# Patient Record
Sex: FEMALE | Race: WHITE | NOT HISPANIC OR LATINO | Employment: UNEMPLOYED | URBAN - METROPOLITAN AREA
[De-identification: names, ages, dates, MRNs, and addresses within clinical notes are randomized per-mention and may not be internally consistent; named-entity substitution may affect disease eponyms.]

---

## 2024-06-17 ENCOUNTER — OFFICE VISIT (OUTPATIENT)
Dept: FAMILY MEDICINE CLINIC | Facility: CLINIC | Age: 24
End: 2024-06-17
Payer: COMMERCIAL

## 2024-06-17 VITALS
HEIGHT: 66 IN | WEIGHT: 152 LBS | HEART RATE: 90 BPM | DIASTOLIC BLOOD PRESSURE: 82 MMHG | OXYGEN SATURATION: 96 % | RESPIRATION RATE: 18 BRPM | BODY MASS INDEX: 24.43 KG/M2 | SYSTOLIC BLOOD PRESSURE: 106 MMHG

## 2024-06-17 DIAGNOSIS — Z11.59 NEED FOR HEPATITIS C SCREENING TEST: ICD-10-CM

## 2024-06-17 DIAGNOSIS — Z23 NEED FOR VACCINATION: ICD-10-CM

## 2024-06-17 DIAGNOSIS — E34.9 HORMONE IMBALANCE: ICD-10-CM

## 2024-06-17 DIAGNOSIS — F64.9 GENDER DYSPHORIA: Primary | ICD-10-CM

## 2024-06-17 PROCEDURE — 90471 IMMUNIZATION ADMIN: CPT | Performed by: NURSE PRACTITIONER

## 2024-06-17 PROCEDURE — 90715 TDAP VACCINE 7 YRS/> IM: CPT | Performed by: NURSE PRACTITIONER

## 2024-06-17 PROCEDURE — 99213 OFFICE O/P EST LOW 20 MIN: CPT | Performed by: NURSE PRACTITIONER

## 2024-06-17 NOTE — PROGRESS NOTES
Subjective   Mary Ellen Morillo is a 23 y.o. adult.     History of Present Illness     Patient presents with gender dysphoria, ongoing, chronic. He reports that they have had feelings of gender dysphoria since  age 18.  He started HRT in [place] approximately 18 months ago and has been doing well. Patient has used the name Jonas since.2021.  Patient has spoken to a therapist and discussed transitioning.  We discussed today HRT and it's affects on the body including infertility. Patient reports that they do not wish to have biological children of their own and acknowledge that the affects may be irreversible. We discussed gender affirming surgeries and patient would like to eventually have top surgery which is scheduled in [place] for the end of July.     The following portions of the patient's history were reviewed and updated as appropriate: allergies, current medications, past family history, past medical history, past social history, past surgical history and problem list.    Review of Systems   Constitutional:  Negative for chills, fatigue and fever.   Respiratory:  Negative for cough, chest tightness, shortness of breath and wheezing.    Cardiovascular:  Negative for chest pain, palpitations and leg swelling.   Neurological:  Negative for dizziness and headache.   Hematological: Negative.    Psychiatric/Behavioral: Negative.  Negative for sleep disturbance.        Objective   Physical Exam  Vitals and nursing note reviewed.   Constitutional:       Appearance: He is well-developed.   HENT:      Head: Normocephalic and atraumatic.   Eyes:      Conjunctiva/sclera: Conjunctivae normal.      Pupils: Pupils are equal, round, and reactive to light.   Cardiovascular:      Rate and Rhythm: Normal rate and regular rhythm.      Heart sounds: Normal heart sounds. No murmur heard.  Pulmonary:      Effort: Pulmonary effort is normal.      Breath sounds: Normal breath sounds.   Neurological:      Mental Status: He is alert and  oriented to person, place, and time.   Psychiatric:         Behavior: Behavior normal.         Thought Content: Thought content normal.         Judgment: Judgment normal.         Vitals:    06/17/24 1321   BP: 106/82   Pulse: 90   Resp: 18   SpO2: 96%     Body mass index is 24.53 kg/m².      Procedures    Assessment & Plan   Problems Addressed this Visit    None  Visit Diagnoses       Gender dysphoria    -  Primary    Relevant Orders    Testosterone    CBC (No Diff)    Lipid Panel With LDL / HDL Ratio    Comprehensive Metabolic Panel    Need for hepatitis C screening test        Relevant Orders    Hepatitis C antibody    Need for vaccination        Relevant Orders    Tdap Vaccine => 8yo IM (BOOSTRIX)    Hormone imbalance        Relevant Orders    Testosterone    CBC (No Diff)    Lipid Panel With LDL / HDL Ratio    Comprehensive Metabolic Panel          Diagnoses         Codes Comments    Gender dysphoria    -  Primary ICD-10-CM: F64.9  ICD-9-CM: 302.6     Need for hepatitis C screening test     ICD-10-CM: Z11.59  ICD-9-CM: V73.89     Need for vaccination     ICD-10-CM: Z23  ICD-9-CM: V05.9     Hormone imbalance     ICD-10-CM: E34.9  ICD-9-CM: 259.9           Testosterone  CBC  Lipid panel  CMP         Established gender dysphoria, hormonal imbalance in a transgender patient with a strong desire for gender affirming hormone therapy.     Gender identity is consistent, persistent, and insistent. Discussed risks, benefits, alternatives, potential side effects of therapy, and typical follow up. Resources provided including [facility] Standards of Care, PFLAG “Our Trans Loved Ones”, and local support groups.      F/u with a phone call/message in 2-3 weeks, and an office visit in 2 months. Will order labs at f/u. Encouraged to sign up for and use [employer].    >50% of this 30 min visit spent in counseling about gender-affirming hormone therapy.     Return in about 3 months (around 9/17/2024) for Labs, Recheck.

## 2024-06-17 NOTE — PATIENT INSTRUCTIONS
Transmale Resources    [facility] - https://[employer]/    [facility] Transgender Support Group - Facebook group    [employer] for Transgender Health, Standards of Care  https://www.wpath.org/publications/soc - pages 37 and 40 especially     [facility], Transgender Health - http://transhealth.[facility]/guidelines      [facility] - [email or web address]    [facility] - [email or web address]/care/medical/[email or web address]-health/     Parents, Families, and Friends of Lesbians and [place] - https://www.pflag.org/ourtranslovedones     [facility] [place] - https://transequality.org/documents    * * * * * * *      www.goodrx.com     1 ml, Luer-Ross Tip  Syringes - ex: https://www.[employer]/dp/I24UXZYB2D     18 G x 1.5” hypodermic needles for drawing up - ex: https://www.[employer]/dp/M01F6QY04W     25 G x 5/8” hypodermic needles for injecting - ex: https://www.[employer]/dp/T24UYAZSQ5/     [facility] Trans Health Injection Guide -   https://[facility].org/wp-content/uploads/MG-6_TransHealth_InjectionGuide.pdf     Injection supplies:  Syringes   Needles   Alcohol swabs  Cotton balls  Sharps container

## 2024-06-18 LAB
ALBUMIN SERPL-MCNC: 4.9 G/DL (ref 4–5)
ALP SERPL-CCNC: 73 IU/L (ref 44–121)
ALT SERPL-CCNC: 15 IU/L (ref 0–32)
AST SERPL-CCNC: 19 IU/L (ref 0–40)
BILIRUB SERPL-MCNC: 0.3 MG/DL (ref 0–1.2)
BUN SERPL-MCNC: 15 MG/DL (ref 6–20)
BUN/CREAT SERPL: 16 (ref 9–23)
CALCIUM SERPL-MCNC: 9.9 MG/DL (ref 8.7–10.2)
CHLORIDE SERPL-SCNC: 104 MMOL/L (ref 96–106)
CHOLEST SERPL-MCNC: 202 MG/DL (ref 100–199)
CO2 SERPL-SCNC: 22 MMOL/L (ref 20–29)
CREAT SERPL-MCNC: 0.95 MG/DL (ref 0.57–1)
EGFRCR SERPLBLD CKD-EPI 2021: 87 ML/MIN/1.73
ERYTHROCYTE [DISTWIDTH] IN BLOOD BY AUTOMATED COUNT: 12.7 % (ref 11.7–15.4)
GLOBULIN SER CALC-MCNC: 2.8 G/DL (ref 1.5–4.5)
GLUCOSE SERPL-MCNC: 79 MG/DL (ref 70–99)
HCT VFR BLD AUTO: 50.1 % (ref 34–46.6)
HCV IGG SERPL QL IA: NON REACTIVE
HDLC SERPL-MCNC: 41 MG/DL
HGB BLD-MCNC: 16.6 G/DL (ref 11.1–15.9)
LDLC SERPL CALC-MCNC: 152 MG/DL (ref 0–99)
LDLC/HDLC SERPL: 3.7 RATIO (ref 0–3.2)
MCH RBC QN AUTO: 29.4 PG (ref 26.6–33)
MCHC RBC AUTO-ENTMCNC: 33.1 G/DL (ref 31.5–35.7)
MCV RBC AUTO: 89 FL (ref 79–97)
PLATELET # BLD AUTO: 281 X10E3/UL (ref 150–450)
POTASSIUM SERPL-SCNC: 4.6 MMOL/L (ref 3.5–5.2)
PROT SERPL-MCNC: 7.7 G/DL (ref 6–8.5)
RBC # BLD AUTO: 5.64 X10E6/UL (ref 3.77–5.28)
SODIUM SERPL-SCNC: 143 MMOL/L (ref 134–144)
TESTOST SERPL-MCNC: 473 NG/DL (ref 13–71)
TRIGL SERPL-MCNC: 52 MG/DL (ref 0–149)
VLDLC SERPL CALC-MCNC: 9 MG/DL (ref 5–40)
WBC # BLD AUTO: 7.2 X10E3/UL (ref 3.4–10.8)

## 2024-08-14 DIAGNOSIS — F64.9 GENDER DYSPHORIA: Primary | ICD-10-CM

## 2024-08-14 NOTE — TELEPHONE ENCOUNTER
Last office visit: 6/17/24    Next office visit: 8/27/24     Last refill: 5/2/24    Last lab: 6/17/24

## 2024-08-15 DIAGNOSIS — E34.9 HORMONE IMBALANCE: ICD-10-CM

## 2024-08-15 DIAGNOSIS — F64.9 GENDER DYSPHORIA: Primary | ICD-10-CM

## 2024-08-15 RX ORDER — NEEDLES, DISPOSABLE 18GX1"
1 NEEDLE, DISPOSABLE MISCELLANEOUS WEEKLY
Qty: 100 EACH | Refills: 1 | Status: SHIPPED | OUTPATIENT
Start: 2024-08-15

## 2024-08-15 RX ORDER — NEEDLES, SAFETY 22GX1 1/2"
1 NEEDLE, DISPOSABLE MISCELLANEOUS WEEKLY
Qty: 100 EACH | Refills: 1 | Status: SHIPPED | OUTPATIENT
Start: 2024-08-15

## 2024-08-15 RX ORDER — SYRINGE, DISPOSABLE, 1 ML
1 SYRINGE, EMPTY DISPOSABLE MISCELLANEOUS WEEKLY
Qty: 100 EACH | Refills: 1 | Status: SHIPPED | OUTPATIENT
Start: 2024-08-15

## 2024-08-15 RX ORDER — TESTOSTERONE CYPIONATE 200 MG/ML
200 INJECTION, SOLUTION INTRAMUSCULAR WEEKLY
Qty: 1 ML | Refills: 3 | OUTPATIENT
Start: 2024-08-15

## 2024-08-15 RX ORDER — TESTOSTERONE CYPIONATE 200 MG/ML
INJECTION, SOLUTION INTRAMUSCULAR
Qty: 4 ML | Refills: 0 | Status: SHIPPED | OUTPATIENT
Start: 2024-08-15

## 2024-08-27 ENCOUNTER — OFFICE VISIT (OUTPATIENT)
Dept: FAMILY MEDICINE CLINIC | Facility: CLINIC | Age: 24
End: 2024-08-27
Payer: COMMERCIAL

## 2024-08-27 VITALS
HEART RATE: 82 BPM | RESPIRATION RATE: 18 BRPM | HEIGHT: 66 IN | DIASTOLIC BLOOD PRESSURE: 72 MMHG | OXYGEN SATURATION: 97 % | BODY MASS INDEX: 24.27 KG/M2 | SYSTOLIC BLOOD PRESSURE: 118 MMHG | WEIGHT: 151 LBS

## 2024-08-27 DIAGNOSIS — E34.9 HORMONE IMBALANCE: ICD-10-CM

## 2024-08-27 DIAGNOSIS — F64.9 GENDER DYSPHORIA: Primary | ICD-10-CM

## 2024-08-27 PROCEDURE — 99213 OFFICE O/P EST LOW 20 MIN: CPT | Performed by: NURSE PRACTITIONER

## 2024-08-27 NOTE — PROGRESS NOTES
Subjective   Mary Ellen Morillo is a 23 y.o. adult.     Chief Complaint   Patient presents with    gender affirming hormone therapy        History of Present Illness   Patient present for follow-up for gender dysphoria. He is 4 weeks post op for top surgery. He had this in [place]. No complications.  He is doing well with is injections and denies any adverse side affects from his injections. He is moving back to [place] at the end of this week.     The following portions of the patient's history were reviewed and updated as appropriate: allergies, current medications, past family history, past medical history, past social history, past surgical history and problem list.    Review of Systems   Constitutional:  Negative for chills, fatigue and fever.   Respiratory:  Negative for cough, chest tightness, shortness of breath and wheezing.    Cardiovascular:  Negative for chest pain, palpitations and leg swelling.   Neurological:  Negative for dizziness and headache.   Hematological: Negative.    Psychiatric/Behavioral: Negative.  Negative for sleep disturbance.        Objective   Physical Exam  Vitals and nursing note reviewed.   Constitutional:       Appearance: He is well-developed.   HENT:      Head: Normocephalic and atraumatic.   Eyes:      Conjunctiva/sclera: Conjunctivae normal.      Pupils: Pupils are equal, round, and reactive to light.   Cardiovascular:      Rate and Rhythm: Normal rate and regular rhythm.      Heart sounds: Normal heart sounds. No murmur heard.  Pulmonary:      Effort: Pulmonary effort is normal.      Breath sounds: Normal breath sounds.   Neurological:      Mental Status: He is alert and oriented to person, place, and time.   Psychiatric:         Behavior: Behavior normal.         Thought Content: Thought content normal.         Judgment: Judgment normal.         Vitals:    08/27/24 1304   BP: 118/72   Pulse: 82   Resp: 18   SpO2: 97%     Body mass index is 24.37  kg/m².      Procedures    Assessment & Plan   Problems Addressed this Visit    None  Visit Diagnoses       Gender dysphoria    -  Primary    Relevant Orders    Testosterone    CBC (No Diff)    Lipid Panel With LDL / HDL Ratio    Comprehensive Metabolic Panel    Hormone imbalance        Relevant Orders    Testosterone    CBC (No Diff)    Lipid Panel With LDL / HDL Ratio    Comprehensive Metabolic Panel          Diagnoses         Codes Comments    Gender dysphoria    -  Primary ICD-10-CM: F64.9  ICD-9-CM: 302.6     Hormone imbalance     ICD-10-CM: E34.9  ICD-9-CM: 259.9           Testosterone  CBC  CMP  Lipid panel         Return in about 3 months (around 11/27/2024) for Labs.  Answers submitted by the patient for this visit:  Primary Reason for Visit (Submitted on 8/21/2024)  What is the primary reason for your visit?: Other, Other  Other (Submitted on 8/21/2024)  Please describe your symptoms.: No current symptoms, just following up from a previous appointment related to patient intake  Have you had these symptoms before?: No  How long have you been having these symptoms?: 1-4 days  Please list any medications you are currently taking for this condition.: N/A  Please describe any probable cause for these symptoms. : N/A

## 2024-08-28 LAB
ALBUMIN SERPL-MCNC: 4.6 G/DL (ref 3.5–5.2)
ALBUMIN/GLOB SERPL: 1.8 G/DL
ALP SERPL-CCNC: 75 U/L (ref 39–117)
ALT SERPL-CCNC: 15 U/L (ref 1–33)
AST SERPL-CCNC: 19 U/L (ref 1–32)
BILIRUB SERPL-MCNC: 0.4 MG/DL (ref 0–1.2)
BUN SERPL-MCNC: 14 MG/DL (ref 6–20)
BUN/CREAT SERPL: 16.1 (ref 7–25)
CALCIUM SERPL-MCNC: 10.2 MG/DL (ref 8.6–10.5)
CHLORIDE SERPL-SCNC: 100 MMOL/L (ref 98–107)
CHOLEST SERPL-MCNC: 186 MG/DL (ref 0–200)
CO2 SERPL-SCNC: 26.8 MMOL/L (ref 22–29)
CREAT SERPL-MCNC: 0.87 MG/DL (ref 0.57–1)
EGFRCR SERPLBLD CKD-EPI 2021: 96.7 ML/MIN/1.73
ERYTHROCYTE [DISTWIDTH] IN BLOOD BY AUTOMATED COUNT: 12.4 % (ref 12.3–15.4)
GLOBULIN SER CALC-MCNC: 2.5 GM/DL
GLUCOSE SERPL-MCNC: 69 MG/DL (ref 65–99)
HCT VFR BLD AUTO: 46 % (ref 34–46.6)
HDLC SERPL-MCNC: 40 MG/DL (ref 40–60)
HGB BLD-MCNC: 15.1 G/DL (ref 12–15.9)
LDLC SERPL CALC-MCNC: 126 MG/DL (ref 0–100)
LDLC/HDLC SERPL: 3.1 {RATIO}
MCH RBC QN AUTO: 29.2 PG (ref 26.6–33)
MCHC RBC AUTO-ENTMCNC: 32.8 G/DL (ref 31.5–35.7)
MCV RBC AUTO: 88.8 FL (ref 79–97)
PLATELET # BLD AUTO: 301 10*3/MM3 (ref 140–450)
POTASSIUM SERPL-SCNC: 4.6 MMOL/L (ref 3.5–5.2)
PROT SERPL-MCNC: 7.1 G/DL (ref 6–8.5)
RBC # BLD AUTO: 5.18 10*6/MM3 (ref 3.77–5.28)
SODIUM SERPL-SCNC: 139 MMOL/L (ref 136–145)
TESTOST SERPL-MCNC: 490 NG/DL (ref 13–71)
TRIGL SERPL-MCNC: 110 MG/DL (ref 0–150)
VLDLC SERPL CALC-MCNC: 20 MG/DL (ref 5–40)
WBC # BLD AUTO: 7.53 10*3/MM3 (ref 3.4–10.8)